# Patient Record
Sex: FEMALE | Race: ASIAN | NOT HISPANIC OR LATINO | URBAN - METROPOLITAN AREA
[De-identification: names, ages, dates, MRNs, and addresses within clinical notes are randomized per-mention and may not be internally consistent; named-entity substitution may affect disease eponyms.]

---

## 2017-06-19 ENCOUNTER — PRENATAL OFFICE VISIT - HEALTHEAST (OUTPATIENT)
Dept: MIDWIFE SERVICES | Facility: CLINIC | Age: 32
End: 2017-06-19

## 2017-06-19 DIAGNOSIS — Z34.81 ENCOUNTER FOR SUPERVISION OF OTHER NORMAL PREGNANCY, FIRST TRIMESTER: ICD-10-CM

## 2017-06-19 DIAGNOSIS — Z32.00 POSSIBLE PREGNANCY: ICD-10-CM

## 2017-06-19 DIAGNOSIS — Z83.49 FAMILY HISTORY OF THYROID DISEASE: ICD-10-CM

## 2017-06-19 DIAGNOSIS — O09.291 H/O MATERNAL THIRD DEGREE PERINEAL LACERATION, CURRENTLY PREGNANT, FIRST TRIMESTER: ICD-10-CM

## 2017-06-19 DIAGNOSIS — N92.6 IRREGULAR MENSES: ICD-10-CM

## 2017-06-19 LAB — HIV 1+2 AB+HIV1 P24 AG SERPL QL IA: NEGATIVE

## 2017-06-19 ASSESSMENT — MIFFLIN-ST. JEOR: SCORE: 1131.9

## 2017-06-20 ENCOUNTER — HOSPITAL ENCOUNTER (OUTPATIENT)
Dept: ULTRASOUND IMAGING | Facility: HOSPITAL | Age: 32
Discharge: HOME | End: 2017-06-20
Attending: ADVANCED PRACTICE MIDWIFE

## 2017-06-20 DIAGNOSIS — N92.6 IRREGULAR MENSES: ICD-10-CM

## 2017-06-20 LAB
HBV SURFACE AG SERPL QL IA: NEGATIVE
HCV AB SERPL QL IA: NEGATIVE
SYPHILIS RPR SCREEN - HISTORICAL: NORMAL

## 2017-06-21 ENCOUNTER — AMBULATORY - HEALTHEAST (OUTPATIENT)
Dept: MIDWIFE SERVICES | Facility: CLINIC | Age: 32
End: 2017-06-21

## 2017-07-12 ENCOUNTER — COMMUNICATION - HEALTHEAST (OUTPATIENT)
Dept: MIDWIFE SERVICES | Facility: CLINIC | Age: 32
End: 2017-07-12

## 2017-08-04 ENCOUNTER — PRENATAL OFFICE VISIT - HEALTHEAST (OUTPATIENT)
Dept: MIDWIFE SERVICES | Facility: CLINIC | Age: 32
End: 2017-08-04

## 2017-08-04 DIAGNOSIS — Z34.82 ENCOUNTER FOR SUPERVISION OF OTHER NORMAL PREGNANCY, SECOND TRIMESTER: ICD-10-CM

## 2017-08-04 RX ORDER — BENZOYL PEROXIDE 5 G/100G
GEL TOPICAL DAILY
Status: SHIPPED | COMMUNITY
Start: 2017-08-04

## 2017-08-04 ASSESSMENT — MIFFLIN-ST. JEOR: SCORE: 1133.71

## 2017-08-28 ENCOUNTER — COMMUNICATION - HEALTHEAST (OUTPATIENT)
Dept: MIDWIFE SERVICES | Facility: CLINIC | Age: 32
End: 2017-08-28

## 2017-08-28 ENCOUNTER — COMMUNICATION - HEALTHEAST (OUTPATIENT)
Dept: OBGYN | Facility: CLINIC | Age: 32
End: 2017-08-28

## 2017-08-29 ENCOUNTER — HOSPITAL ENCOUNTER (OUTPATIENT)
Dept: ULTRASOUND IMAGING | Facility: CLINIC | Age: 32
Discharge: HOME | End: 2017-08-29
Attending: MIDWIFE

## 2017-08-29 ENCOUNTER — OFFICE VISIT - HEALTHEAST (OUTPATIENT)
Dept: FAMILY MEDICINE | Facility: CLINIC | Age: 32
End: 2017-08-29

## 2017-08-29 ENCOUNTER — AMBULATORY - HEALTHEAST (OUTPATIENT)
Dept: MIDWIFE SERVICES | Facility: CLINIC | Age: 32
End: 2017-08-29

## 2017-08-29 DIAGNOSIS — L50.9 URTICARIA: ICD-10-CM

## 2017-08-29 DIAGNOSIS — Z34.82 ENCOUNTER FOR SUPERVISION OF OTHER NORMAL PREGNANCY, SECOND TRIMESTER: ICD-10-CM

## 2017-08-29 DIAGNOSIS — R00.0 TACHYCARDIA: ICD-10-CM

## 2017-08-30 ENCOUNTER — COMMUNICATION - HEALTHEAST (OUTPATIENT)
Dept: SCHEDULING | Facility: CLINIC | Age: 32
End: 2017-08-30

## 2017-08-30 ENCOUNTER — COMMUNICATION - HEALTHEAST (OUTPATIENT)
Dept: ADMINISTRATIVE | Facility: CLINIC | Age: 32
End: 2017-08-30

## 2017-08-30 ENCOUNTER — COMMUNICATION - HEALTHEAST (OUTPATIENT)
Dept: MIDWIFE SERVICES | Facility: CLINIC | Age: 32
End: 2017-08-30

## 2017-08-31 ENCOUNTER — AMBULATORY - HEALTHEAST (OUTPATIENT)
Dept: MIDWIFE SERVICES | Facility: CLINIC | Age: 32
End: 2017-08-31

## 2017-08-31 ENCOUNTER — OFFICE VISIT - HEALTHEAST (OUTPATIENT)
Dept: INTERNAL MEDICINE | Facility: CLINIC | Age: 32
End: 2017-08-31

## 2017-08-31 DIAGNOSIS — Z09 HOSPITAL DISCHARGE FOLLOW-UP: ICD-10-CM

## 2017-08-31 DIAGNOSIS — L50.9 URTICARIA: ICD-10-CM

## 2017-08-31 DIAGNOSIS — L29.9 PRURITUS: ICD-10-CM

## 2017-08-31 DIAGNOSIS — R22.0 FACIAL SWELLING: ICD-10-CM

## 2017-09-06 ENCOUNTER — COMMUNICATION - HEALTHEAST (OUTPATIENT)
Dept: MIDWIFE SERVICES | Facility: CLINIC | Age: 32
End: 2017-09-06

## 2017-09-07 ENCOUNTER — PRENATAL OFFICE VISIT - HEALTHEAST (OUTPATIENT)
Dept: MIDWIFE SERVICES | Facility: CLINIC | Age: 32
End: 2017-09-07

## 2017-09-07 DIAGNOSIS — Z34.82 ENCOUNTER FOR SUPERVISION OF OTHER NORMAL PREGNANCY, SECOND TRIMESTER: ICD-10-CM

## 2017-09-07 DIAGNOSIS — R05.9 COUGH: ICD-10-CM

## 2017-09-07 ASSESSMENT — MIFFLIN-ST. JEOR: SCORE: 1147.32

## 2017-10-19 ENCOUNTER — PRENATAL OFFICE VISIT - HEALTHEAST (OUTPATIENT)
Dept: MIDWIFE SERVICES | Facility: CLINIC | Age: 32
End: 2017-10-19

## 2017-10-19 DIAGNOSIS — Z34.83 ENCOUNTER FOR SUPERVISION OF OTHER NORMAL PREGNANCY IN THIRD TRIMESTER: ICD-10-CM

## 2017-10-19 ASSESSMENT — MIFFLIN-ST. JEOR: SCORE: 1191.77

## 2017-10-20 LAB
HCV AB SERPL QL IA: NEGATIVE
SYPHILIS RPR SCREEN - HISTORICAL: NORMAL

## 2017-11-17 ENCOUNTER — PRENATAL OFFICE VISIT - HEALTHEAST (OUTPATIENT)
Dept: MIDWIFE SERVICES | Facility: CLINIC | Age: 32
End: 2017-11-17

## 2017-11-17 DIAGNOSIS — Z34.83 ENCOUNTER FOR SUPERVISION OF OTHER NORMAL PREGNANCY IN THIRD TRIMESTER: ICD-10-CM

## 2017-11-17 DIAGNOSIS — M25.552 HIP PAIN, ACUTE, LEFT: ICD-10-CM

## 2017-11-17 ASSESSMENT — MIFFLIN-ST. JEOR: SCORE: 1220.8

## 2017-12-07 ENCOUNTER — PRENATAL OFFICE VISIT - HEALTHEAST (OUTPATIENT)
Dept: MIDWIFE SERVICES | Facility: CLINIC | Age: 32
End: 2017-12-07

## 2017-12-07 DIAGNOSIS — O09.291 H/O MATERNAL THIRD DEGREE PERINEAL LACERATION, CURRENTLY PREGNANT, FIRST TRIMESTER: ICD-10-CM

## 2017-12-07 DIAGNOSIS — Z34.83 ENCOUNTER FOR SUPERVISION OF OTHER NORMAL PREGNANCY IN THIRD TRIMESTER: ICD-10-CM

## 2017-12-07 ASSESSMENT — MIFFLIN-ST. JEOR: SCORE: 1235.77

## 2017-12-21 ENCOUNTER — PRENATAL OFFICE VISIT - HEALTHEAST (OUTPATIENT)
Dept: MIDWIFE SERVICES | Facility: CLINIC | Age: 32
End: 2017-12-21

## 2017-12-21 DIAGNOSIS — Z34.83 ENCOUNTER FOR SUPERVISION OF OTHER NORMAL PREGNANCY IN THIRD TRIMESTER: ICD-10-CM

## 2017-12-21 DIAGNOSIS — B95.1 POSITIVE GBS TEST: ICD-10-CM

## 2017-12-21 ASSESSMENT — MIFFLIN-ST. JEOR: SCORE: 1243.03

## 2017-12-29 ENCOUNTER — PRENATAL OFFICE VISIT - HEALTHEAST (OUTPATIENT)
Dept: MIDWIFE SERVICES | Facility: CLINIC | Age: 32
End: 2017-12-29

## 2017-12-29 DIAGNOSIS — B95.1 POSITIVE GBS TEST: ICD-10-CM

## 2017-12-29 DIAGNOSIS — Z34.83 ENCOUNTER FOR SUPERVISION OF OTHER NORMAL PREGNANCY IN THIRD TRIMESTER: ICD-10-CM

## 2017-12-29 ASSESSMENT — MIFFLIN-ST. JEOR: SCORE: 1250.74

## 2017-12-30 ENCOUNTER — COMMUNICATION - HEALTHEAST (OUTPATIENT)
Dept: MIDWIFE SERVICES | Facility: CLINIC | Age: 32
End: 2017-12-30

## 2017-12-30 DIAGNOSIS — Z34.83 ENCOUNTER FOR SUPERVISION OF OTHER NORMAL PREGNANCY IN THIRD TRIMESTER: ICD-10-CM

## 2017-12-30 DIAGNOSIS — M25.552 HIP PAIN, ACUTE, LEFT: ICD-10-CM

## 2018-01-05 ENCOUNTER — PRENATAL OFFICE VISIT - HEALTHEAST (OUTPATIENT)
Dept: MIDWIFE SERVICES | Facility: CLINIC | Age: 33
End: 2018-01-05

## 2018-01-05 DIAGNOSIS — Z34.83 ENCOUNTER FOR SUPERVISION OF OTHER NORMAL PREGNANCY IN THIRD TRIMESTER: ICD-10-CM

## 2018-01-05 ASSESSMENT — MIFFLIN-ST. JEOR: SCORE: 1251.65

## 2018-01-08 ENCOUNTER — PRENATAL OFFICE VISIT - HEALTHEAST (OUTPATIENT)
Dept: MIDWIFE SERVICES | Facility: CLINIC | Age: 33
End: 2018-01-08

## 2018-01-08 DIAGNOSIS — Z34.83 ENCOUNTER FOR SUPERVISION OF OTHER NORMAL PREGNANCY IN THIRD TRIMESTER: ICD-10-CM

## 2018-01-08 DIAGNOSIS — O48.0 POST-TERM PREGNANCY, 40-42 WEEKS OF GESTATION: ICD-10-CM

## 2018-01-08 ASSESSMENT — MIFFLIN-ST. JEOR: SCORE: 1257.54

## 2018-01-10 ENCOUNTER — ANESTHESIA - HEALTHEAST (OUTPATIENT)
Dept: OBGYN | Facility: HOSPITAL | Age: 33
End: 2018-01-10

## 2018-01-10 ENCOUNTER — COMMUNICATION - HEALTHEAST (OUTPATIENT)
Dept: OBGYN | Facility: CLINIC | Age: 33
End: 2018-01-10

## 2018-01-10 DIAGNOSIS — M25.552 HIP PAIN, ACUTE, LEFT: ICD-10-CM

## 2018-01-10 DIAGNOSIS — Z34.83 ENCOUNTER FOR SUPERVISION OF OTHER NORMAL PREGNANCY IN THIRD TRIMESTER: ICD-10-CM

## 2018-01-10 DIAGNOSIS — O48.0 POST-TERM PREGNANCY, 40-42 WEEKS OF GESTATION: ICD-10-CM

## 2018-01-11 ENCOUNTER — HOME CARE/HOSPICE - HEALTHEAST (OUTPATIENT)
Dept: HOME HEALTH SERVICES | Facility: HOME HEALTH | Age: 33
End: 2018-01-11

## 2018-01-12 ENCOUNTER — HOME CARE/HOSPICE - HEALTHEAST (OUTPATIENT)
Dept: HOME HEALTH SERVICES | Facility: HOME HEALTH | Age: 33
End: 2018-01-12

## 2018-01-16 ENCOUNTER — COMMUNICATION - HEALTHEAST (OUTPATIENT)
Dept: MIDWIFE SERVICES | Facility: CLINIC | Age: 33
End: 2018-01-16

## 2018-01-23 ENCOUNTER — COMMUNICATION - HEALTHEAST (OUTPATIENT)
Dept: TELEHEALTH | Facility: CLINIC | Age: 33
End: 2018-01-23

## 2018-01-23 ENCOUNTER — COMMUNICATION - HEALTHEAST (OUTPATIENT)
Dept: HEALTH INFORMATION MANAGEMENT | Facility: CLINIC | Age: 33
End: 2018-01-23

## 2018-02-19 ENCOUNTER — OFFICE VISIT - HEALTHEAST (OUTPATIENT)
Dept: MIDWIFE SERVICES | Facility: CLINIC | Age: 33
End: 2018-02-19

## 2018-02-19 DIAGNOSIS — Z30.9 CONTRACEPTIVE MANAGEMENT: ICD-10-CM

## 2018-02-19 ASSESSMENT — MIFFLIN-ST. JEOR: SCORE: 1193.93

## 2018-02-26 ENCOUNTER — OFFICE VISIT - HEALTHEAST (OUTPATIENT)
Dept: MIDWIFE SERVICES | Facility: CLINIC | Age: 33
End: 2018-02-26

## 2018-02-26 ENCOUNTER — AMBULATORY - HEALTHEAST (OUTPATIENT)
Dept: MIDWIFE SERVICES | Facility: CLINIC | Age: 33
End: 2018-02-26

## 2018-02-26 ENCOUNTER — AMBULATORY - HEALTHEAST (OUTPATIENT)
Dept: LAB | Facility: CLINIC | Age: 33
End: 2018-02-26

## 2018-02-26 DIAGNOSIS — Z97.5 NEXPLANON IN PLACE: ICD-10-CM

## 2018-02-26 DIAGNOSIS — Z30.017 NEXPLANON INSERTION: ICD-10-CM

## 2018-02-26 LAB
HCG UR QL: NEGATIVE
SP GR UR STRIP: 1.02 (ref 1–1.03)

## 2018-02-26 ASSESSMENT — MIFFLIN-ST. JEOR: SCORE: 1183.5

## 2021-05-31 VITALS — BODY MASS INDEX: 23.15 KG/M2 | WEIGHT: 117.9 LBS | HEIGHT: 60 IN

## 2021-05-31 VITALS — WEIGHT: 114.9 LBS | HEIGHT: 60 IN | BODY MASS INDEX: 22.56 KG/M2

## 2021-05-31 VITALS — WEIGHT: 134.1 LBS | HEIGHT: 60 IN | BODY MASS INDEX: 26.33 KG/M2

## 2021-05-31 VITALS — BODY MASS INDEX: 24.03 KG/M2 | WEIGHT: 122 LBS

## 2021-05-31 VITALS — HEIGHT: 60 IN | WEIGHT: 137.4 LBS | BODY MASS INDEX: 26.97 KG/M2

## 2021-05-31 VITALS — BODY MASS INDEX: 27.62 KG/M2 | HEIGHT: 60 IN | WEIGHT: 140.7 LBS

## 2021-05-31 VITALS — WEIGHT: 114.5 LBS | HEIGHT: 60 IN | BODY MASS INDEX: 22.48 KG/M2

## 2021-05-31 VITALS — BODY MASS INDEX: 27.92 KG/M2 | WEIGHT: 142.2 LBS | HEIGHT: 60 IN

## 2021-05-31 VITALS — BODY MASS INDEX: 22.65 KG/M2 | WEIGHT: 115 LBS

## 2021-05-31 VITALS — WEIGHT: 140.9 LBS | HEIGHT: 60 IN | BODY MASS INDEX: 27.66 KG/M2

## 2021-05-31 VITALS — BODY MASS INDEX: 25.07 KG/M2 | HEIGHT: 60 IN | WEIGHT: 127.7 LBS

## 2021-05-31 VITALS — BODY MASS INDEX: 27.29 KG/M2 | WEIGHT: 139 LBS | HEIGHT: 60 IN

## 2021-06-01 VITALS — BODY MASS INDEX: 24.54 KG/M2 | WEIGHT: 125 LBS | HEIGHT: 60 IN

## 2021-06-01 VITALS — BODY MASS INDEX: 24.99 KG/M2 | HEIGHT: 60 IN | WEIGHT: 127.3 LBS

## 2021-06-11 NOTE — PROGRESS NOTES
PRENATAL VISIT   FIRST OBSTETRICAL EXAM - OB    Assessment / Impression     First prenatal visit at 10w6d    Family hx of thyroid disorder  Breast implants   Hep B carrier    Plan:     -IOB labs drawn.  -Pt is not interested in drawing lead level.  -Patient is interested in waterbirth.  HIV and Hep C drawn today.  -Reviewed prenatal care schedule.  -Optimal nutrition and weight gain discussed. Pregnancy weight gain of 25-35 lbs (BMI 18.5-24.9) encouraged.   -Anticipatory guidance for common pregnancy questions and concerns reviewed.   -Danger s/sx for this trimester reviewed with patient.  -Reviewed genetic carrier screening specific to patients ethnic heritage.  Patient declines the screening  -Reviewed genetic aneuploidy screening options. Patient will consider first trimester screening.  She would like to do the dating ultrasound first and decide from there.  I wrote down all the information of her options between first trimester screening and verify screening.  -The following referrals were given to patient for follow up: First trimester ultrasound for dating due to last period Being irregular.  -IOB packet given and reviewed with patient.  -CNM services and hospital options reviewed; emergency and scheduling phone numbers given to patient.  -Return to clinic 4-6 weeks    Total time spent with patient: 45 minutes, >50% time spent counseling and coordinating care.    Subjective:    Whitney Colón is a 32 y.o.  here today for her First Obstetrical Exam.  She is excited to be pregnant as her and her  were actively trying.  She believes that she only wants to children decided to get pregnant now so she could be done being pregnant as soon as she is able.  She breast-fed her son, German, for 4 months.  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    Her last menstrual period was somewhat irregular.  She called 2017 her last  "menstrual period but 2 weeks after that she had a menstrual period that was \"less than normal\".  It could have been bleeding in pregnancy or it could have been irregular menses.  Either way, I think a dating ultrasound is appropriate due to this unusual cycle.    She also notes that her  is a hepatitis B carrier, but she is not worried about it as she knows that she is immune to hepatitis B.  I discussed all the normal labs that we do at an IOB which includes checking Hep B.    Education level: College Graduate  Occupation: Ultrasound tech  Partners name: Cullen  Partners occupation:     OB History    Para Term  AB Living   2 1 1 0 0 1   SAB TAB Ectopic Multiple Live Births   0 0 0 0 1      # Outcome Date GA Lbr Kameron/2nd Weight Sex Delivery Anes PTL Lv   2 Current            1 Term 16 39w4d 03:00 / 01:46 7 lb 1.2 oz (3.21 kg) M Vag-Spont Inhalation,Local N [place]      Birth Comments: GBS+ with adequate txtmt, partial 3rd degree tear w/ bilat sulcus tears and L labial tear, 400ml EBL, BF x4 mths          Expected Date of Delivery: 2018, by Last Menstrual Period    Past Medical History:   Diagnosis Date     Acne     uses 1% clinda/5% benzyl peroxide gel     Heart murmur     discovered with first pregnancy     Past Surgical History:   Procedure Laterality Date     WISDOM TOOTH EXTRACTION      no comp with GA     Social History   Substance Use Topics     Smoking status: Never Smoker     Smokeless tobacco: Never Used     Alcohol use No     Current Outpatient Prescriptions   Medication Sig Dispense Refill     cholecalciferol, vitamin D3, (VITAMIN D3) 2,000 unit cap Take 1 capsule by mouth.       PRENATAL VIT #91/FE FUM/FA/DHA (PRENATAL + DHA ORAL) Take 1 capsule by mouth.       No current facility-administered medications for this visit.      Allergies   Allergen Reactions     Other Environmental Allergy      Seasonal- fall              Pregnancy Risk Factors: None    EPDS score " "today: 8    Review of Systems  General:  Denies problem  Eyes: Denies problem  Ears/Nose/Throat: Denies problem  Cardiovascular: Denies problem  Respiratory:  Denies problem  Gastrointestinal:  Mild Nausea, vomiting 2x/week with pregnancy.  Genitourinary: Denies problem  Musculoskeletal:  Denies problem  Skin: Denies problem  Neurologic: Denies problem  Psychiatric: Denies problem  Endocrine: Denies problem  Heme/Lymphatic: Denies problem   Allergic/Immunologic: Denies problem       Objective:   Objective    Vitals:    06/19/17 1005   BP: 96/52   Pulse: 84   Resp: 18   Weight: 114 lb 8 oz (51.9 kg)   Height: 4' 11.75\" (1.518 m)     Physical Exam:  General Appearance: Alert, cooperative, no distress, appears stated age  Head: Normocephalic, without obvious abnormality, atraumatic  Eyes: Conjunctiva/corneas clear, does not wear corrective lenses  Neck: Supple, symmetrical, trachea midline, no adenopathy  Thyroid: not enlarged, symmetric, no tenderness/mass/nodules  Back: Symmetric, no curvature, ROM normal, no CVA tenderness  Lungs: Clear to auscultation bilaterally, respirations unlabored  Heart: Regular rate and rhythm, S1 and S2 normal, faint known murmur heard, rub, or gallop  Breasts: No breast masses, tenderness, asymmetry, or nipple discharge. Nipples are everted.  Has breast implants - scar in bilateral axilla.  Abdomen: Soft, non-tender, bowel sounds active all four quadrants, no masses.   FHT: 170s   Vulva:  no sign of lesions or condyloma, normal hair distribution  Vagina: pink, normal rugae, no abnormal discharge  Cervix:  long/thick/closed, no lesions or inflammation noted, negative CMT with exam  Uterus: Mid position, non tender, enlarged approximately 12 weeks size  Adnexa:  no masses appreciated, non-tender with palpation  Pelvic spines:AVERAGE  Sacrum:STRAIGHT  Suprapubic Arch:NORMAL  Pelvis type:gynecoid            Extremities: Extremities normal, atraumatic, no cyanosis or edema  Skin: Skin color, " texture, turgor normal, no rashes or lesions  Lymph nodes: Cervical, supraclavicular, and axillary nodes normal  Neurologic: Alert and oriented x 3.    Lab:   Results for orders placed or performed during the hospital encounter of 07/11/16   Rupture of Membrane Test or Screen   Result Value Ref Range    Rupture Fetal Membrane Positive (!) Negative   Culture, Urine   Result Value Ref Range    Culture Mixture of urogenital organisms with     Culture 10,000-50,000 col/ml Streptococcus group B (!)    Urinalysis-UC if Indicated   Result Value Ref Range    Color, UA Yellow Colorless, Yellow, Straw, Light Yellow    Clarity, UA Turbid (!) Clear    Glucose, UA Negative Negative    Bilirubin, UA Negative Negative    Ketones, UA Negative Negative, 60 mg/dL    Specific Gravity, UA 1.010 1.001 - 1.030    Blood, UA Moderate (!) Negative    pH, UA 6.5 4.5 - 8.0    Protein, UA 70 mg/dL (!) Negative mg/dL    Urobilinogen, UA <2.0 E.U./dL <2.0 E.U./dL, 2.0 E.U./dL    Nitrite, UA Negative Negative    Leukocytes, UA Moderate (!) Negative    Bacteria, UA None Seen None Seen hpf    RBC, UA >100 (!) None Seen, 0-2 hpf    WBC, UA 10-25 (!) None Seen, 0-5 hpf    Squam Epithel, UA >100 (!) None Seen, 0-5 lpf   Syphilis Screen, Cascade   Result Value Ref Range    Syphilis Screen Cascade Non-Reactive Non-Reactive   OB External Results   Result Value Ref Range    HBsAg External Result Negative    RPR   Result Value Ref Range    Syphilis Screen Cascade Non-Reactive Non-Reactive

## 2021-06-12 NOTE — PROGRESS NOTES
Whitney is here alone for a Hitesh visit. She was seen in the [facility] ED on 8/29/17 for urticaria and facial swelling. She had been in a hot tub in the [place]. She was given prednisone 40mg po and symptoms resolved. Now she has had a tickle in her throat/cough x 7 days. Occasionally coughs up yellowish mucus.  Throat not particularly sore. Temp 97.9 today. Lungs are clear. Discussed robitussin or delsym cough syrup, gargling with salt water, cough lozenges. Given written info on colds in pregnancy. Information given on 1 hr GCT/RPR/Hgb. She prefers RTC in 6 weeks for those labs. Reviewed her 20 week fetal survey US. Anticipates boy. She works [employer] at [employer] [place] and her son is 14 months old. Recommended PCP if febrile or cough symptoms worsen. Denies PTL. Discussed TDap for next visit.

## 2021-06-12 NOTE — PROGRESS NOTES
Assessment:     1. Urticaria     2. Tachycardia       Given urticarial rash with oral complaints and tachycardia at 21 wks pregnant, will transfer to ER for complete evaluation and treatment.      Plan:     Discussed with patient that she requires further evaluation than can be provided in the [facility] at this time.  Patient transferred to the ED via . ED notified of her impending arrival. Patient agreeable with plan      Subjective:       Whitney Colón is a 32 y.o. female who presents with  for evaluation of hives and facial and oral edema.  This started 3 days ago, and have been gradually worsening since that time.   Associated symptoms include red, itchy rash, joint pain, facial swelling yesterday morning, tongue swelling started last evening. She is able to swallow but has pain with swallowing. Has also had palpitations for 2 days.  Denies any SOB, chest pain. She is 21 wks pregnant. She has been taking benadryl 25mg every 4 - 6 hours without significant improvement in rash. She is unsure of the potential trigger - she had been in [place] and had been in a hot tub. She had also used a new lotion. No other new foods, soaps, detergents, clothing, environmental exposures known to trigger the reaction.    Review of Systems  Pertinent items are noted in HPI.      Objective:      /60  Pulse (!) 125  Temp 99.4  F (37.4  C) (Oral)   Resp 16  Wt 115 lb (52.2 kg)  LMP 04/04/2017 (Exact Date)  SpO2 99%  Breastfeeding? No  BMI 22.65 kg/m2  General appearance: alert, appears stated age and cooperative  Throat: lips, mucosa, and tongue normal; teeth and gums normal and no visible angioedema.   Lungs: clear to auscultation bilaterally  Heart: tachy, normal rhythm, no clicks, murmurs, gallops or rubs.   Skin: erythematous maculopapular urticarial rash on trunk and extremeties. Excoriation present. No vesicles, scale or crust.

## 2021-06-12 NOTE — PROGRESS NOTES
Whitney is here alone for BHANU visit. She is surprised with no weight gain past month. She had mild nausea until 15 - 16 weeks. Emesis only x 2. We discussed nutrition and encouraged 3 meals and 2-3 snacks daily. States she gained about 40# in first pregnancy. She accepted the QS which was drawn today. Referral for fetal survey was sent. She is an [employer] and believes she is having a second boy. Reviewed IOB labs. Started CNM details which were placed in chart.

## 2021-06-13 NOTE — PROGRESS NOTES
Whitney is here alone today for BHANU visit and 1 hr GCT, Hgb, and RPR. URI symptoms resolved. Discussed WB and she has some interest so will get hepatitis C today. Written WB information given. PTL reviewed. Discussed postpartum contraception. She is most interested in IUD or Nexplanon. Brochures given each method. MC message sent regarding 1 hr BS = 102 and hemoglobin = 12.3

## 2021-06-14 NOTE — PROGRESS NOTES
Whitney is here for her BHANU visit at 35w 2d. She reports feeling well. She is still coping with her left sided hip pain. She is reporting feeling an increase in pressure, and some mild/moderate gracie dugan contractions. She would like an exam today to see if she is dilating. (SVE: closed, thick, high). She denies LOF, and vaginal bleeding. S/S of PTL and PreE reviewed and when to call. Also encouraged to notify CNM if changes or decrease in fetal movements.Hx of 3rd degree tear, Reviewed perineal massage and ways to lower risk.  GBS swab collected, hemoglobin today.RTC 1 week.    Ritu Dotson RN, SNM  I was present and agree with SNM assessment, Leopolds confirmed vertex.  ADAM Palacio,CNM

## 2021-06-14 NOTE — PROGRESS NOTES
Whitney Colón is a 32 y.o. female  at 32w3d weeks doing well. Here alone. Denies any warning s/sx. GFM. Notes questions about episiotomy. She states that she had a 3rd degree perineal laceration and is wondering if having an episiotomy would prevent that. Discussed that sometimes episiotomies carry a risk of extending further and it's not routine to cut preventative episiotomies. She states she had thought that by cutting an episiotomy, it would have prevented a laceration but now understands that isn't necessarily the case. She also notes some left hip pain from always sleeping on her left side. We discussed she could also sleep on her R side and on her back as long as she was elevated approximately 30 degrees up off her back. We also discussed chiropractic care that she could access to help adjust her pelvis and possibly bring relief. She states she will look into this. Plan is to RTC 2 weeks. She also wonders if we saw that she had an echocardiogram in her first pregnancy for a likely benign murmur; records reviewed and no follow up was indicated and they felt it was likely benign and amplified with increased blood volume in pregnancy. Reviewed this with patient.

## 2021-06-14 NOTE — PROGRESS NOTES
Whitney is here alone for her BHANU visit @ 37 weeks. She works 2 days next week and then will be on maternity leave. States she has had quite a few contractions and wishes her cervix to be checked. EO 1 cm, IO closed, 50%, soft, vertex -3 station. Suture palpated through lower uterine segment. Continues to have discomfort with L hip. Discussed positive GBS culture - she was also positive with her first baby. She had adequate prophylaxis and was discharged after just 1 overnight which is her preference this time. Discussed calling if SROM or in early labor as positive GBS.

## 2021-06-15 NOTE — ANESTHESIA POSTPROCEDURE EVALUATION
Patient: Whitney Colón  * No procedures listed *  Anesthesia type: epidural    Patient location: Labor and Delivery  Last vitals:   Vitals:    01/10/18 1555   BP: 111/69   Pulse: 100   Resp: 16   Temp: 37.1  C (98.8  F)   SpO2:      Post vital signs: stable  Level of consciousness: awake and responds to simple questions  Post-anesthesia pain: pain controlled  Post-anesthesia nausea and vomiting: no  Pulmonary: unassisted, return to baseline  Cardiovascular: stable and blood pressure at baseline  Hydration: adequate  Anesthetic events: no    QCDR Measures:  ASA# 11 - [place]-op Cardiac Arrest: ASA11B - Patient did NOT experience unanticipated cardiac arrest  ASA# 12 - [place]-op Mortality Rate: ASA12B - Patient did NOT die  ASA# 13 - PACU Re-Intubation Rate: NA - No ETT / LMA used for case  ASA# 10 - Composite Anes Safety: ASA10A - No serious adverse event    Additional Notes:

## 2021-06-15 NOTE — ANESTHESIA PREPROCEDURE EVALUATION
Anesthesia Evaluation      Patient summary reviewed   No history of anesthetic complications     Airway   Mallampati: II  Neck ROM: full   Pulmonary - negative ROS and normal exam                          Cardiovascular - negative ROS and normal exam   Neuro/Psych - negative ROS     Endo/Other    (+) pregnant     GI/Hepatic/Renal - negative ROS      Other findings: Physiologic changes of pregnancy      Dental - normal exam                        Anesthesia Plan  Planned anesthetic: epidural  Patient requests labor epidural. Chart reviewed, including labs. Reviewed options and risks with the patient, including but not limited to: bleeding, infection, damage to tissues under the skin (nerves, muscles, blood vessels), hypotension, headache, and epidural failure. Questions answered, consent signed. Patient agrees to elective labor epidural.         ASA 2     Anesthetic plan and risks discussed with: patient and spouse    Post-op plan: routine recovery and epidural analgesia        Results for orders placed or performed in visit on 12/07/17   Group B Strep Screen by PCR   Result Value Ref Range    Group B Strep Antigen Positive (!) Negative    Allergic to Penicillin No    Hemoglobin   Result Value Ref Range    Hemoglobin 12.2 12.0 - 16.0 g/dL

## 2021-06-15 NOTE — PROGRESS NOTES
"Whitney is here alone for a return OB visit at 38 weeks and 3 days.  She states that today is her last day of work.  She requests a vaginal exam today.  She is 1/50/-2/soft/posterior and these results were discussed with the patient.  Sutures were palpated on exam today.  She expresses that she would like to be \"done\" with this pregnancy.  She states that if she gets to 40 weeks she will want to be induced.  I discussed that she should come back for her routine OB visit next week and at that point if she still pregnant, we can begin to set up elective induction scheduling for her at that time.  Patient is aware that she will need to call if she breaks her water or on the earlier side in labor to come in for GBS prophylaxis.  Plan is to return to clinic in 1 week.  "

## 2021-06-15 NOTE — PROGRESS NOTES
Whitney Colón is here by herself for a routine prenatal visit at 39w6d.  She is hoping that her cervix is ripe enough so that she can schedule an elective induction.  Fetal movement is normal. Interval weight gain is approriate. Discussed postdates management, risks of expectant management to 42 weeks if BPP/NST are normal and no medical indications for IOL arise sooner vs. IOL at 41+ weeks. Pt desires cervical ripening/IOL.  She thought her due date was today, 1/8.  I explained that her pregnancy is dated via her LMP.  I explained that the early ultrasound put her due date at 1/8, which in that case we continue with the LMP date.  Her cervix was unchanged from last Friday with a Frey score of 4.  I explained that we could not do an elective induction at this time but I did give her the option of membranes sweeping with risk/benefits explained.  She desires membrane sweep and it was performed without difficulty very scant amount of brown discharge noted and mostly mucus noted.  I did set up cervical ripening induction for Monday, 1/15 at 4 PM at [facility].  I discussed the 3 cervical ripening options and that we will make a plan on admission.  I also instructed her to call the unit at 3 PM.  I also went over in detail of when to call the on-call nurse midwife I reviewed the appropriate phone number to call for spontaneous labor.  I also gave her the option to schedule an additional appointment to see if her cervix is ready later in the week for Pitocin or AROM.  Advised she do fetal movement counts daily. Anticipatory guidance, signs of symptoms of labor or SROM, third trimester warning signs, when to call and CNM contact information reviewed.

## 2021-06-15 NOTE — ANESTHESIA PROCEDURE NOTES
Epidural Block    Patient location during procedure: OB  Time Called: 1/10/2018 5:40 AM  Reason for Block:labor epidural  Staffing:  Performing  Anesthesiologist: TEJA CHEEMA  Preanesthetic Checklist  Completed: patient identified, risks, benefits, and alternatives discussed, timeout performed, consent obtained, at patient's request, airway assessed, oxygen available, suction available, emergency drugs available and hand hygiene performed  Procedure  Patient position: sitting  Prep: ChloraPrep and site prepped and draped  Patient monitoring: continuous pulse oximetry, heart rate and blood pressure  Approach: midline  Location: L3-L4  Injection technique: JENIFER saline  Number of Attempts:1  Needle  Needle type: [employer]   Needle gauge: 18 G     Catheter in Space: 6  Assessment  Sensory level:  No complications      Additional Notes:  Patient requests labor epidural. Chart reviewed, including labs. Reviewed options and risks with the patient, including but not limited to: bleeding, infection, damage to tissues under the skin (nerves, muscles, blood vessels), hypotension, headache, and epidural failure. Questions answered, consent signed. Patient agrees to elective labor epidural.     Hands washed, sterile technique used, including scrub hat, mask, and gloves.     Test dose negative for heme/SAB.

## 2021-06-15 NOTE — PROGRESS NOTES
Whitney Colón presents to clinic today for a routine prenatal visit. Feeling okay, ready.  Reports + fetal movement daily. Denies any regular contractions or LOF, reports a small amount of bleeding with loss of mucus plug last week, nothing since. Really wants elective IOL.  Discussed cervical exam and bentley score today of 5.  Discussed parameters for elective IOL and recommended that patient make another appointment for early next week for re-evaluation of her cervix if desired, but IOL not a safe option at this point, patient states understanding.  Discussed ways to encourage labor. Reports normal appetite, eating well and hydrating adequately.  Sleeping well. Warning sxs and when to call reviewed, patient has CNM contact information.  Anticipatory guidance re: end of pregnancy self care provided today.

## 2021-06-16 PROBLEM — Z97.5 NEXPLANON IN PLACE: Status: ACTIVE | Noted: 2018-02-26

## 2021-06-16 PROBLEM — Z83.49 FAMILY HISTORY OF THYROID DISEASE: Status: ACTIVE | Noted: 2017-06-19

## 2021-06-16 PROBLEM — M25.552 HIP PAIN, ACUTE, LEFT: Status: ACTIVE | Noted: 2017-11-17

## 2021-06-16 NOTE — PROGRESS NOTES
JAIME@Patient ID: Whitney Colón is a 32 y.o. female.  Assessment:   Normal postpartum exam at ~6 weeks postpartum.   lactating    Plan:    1. Pap smear not done at today's visit. Due for annual Gyn exam in February 2019   2. Desired contraception: Nexplanon.  Discussed risks/benefits/side effects of Nexplanon and patient desires to have inserted.  Explained no unprotected IC x2 weeks.  She states they had IC one week ago using withdrawal.  Explained that she can schedule Nexplanon in one week - refrain from IC or use condoms.  Will do UPT on arrival.    3. Discussed resumption of exercise and setting a goal to return to pre-pregnancy weight in the next 6-12 months.   4.  Resumption of intercourse reviewed with possible changes in libido and vaginal lubrication while nursing.  5.  Nutrition and supplements reviewed.  Advised continuation of a prenatal or multivitamin, also Vitamin D3 5000 IU geltab daily and an omega 3 fatty acid supplement.  6. Adjustment to parenting, self care and open communication with her support system discussed. Warning signs and symptoms related to postpartum mood disorders reviewed.   7.  Gave information on rectus diastisis and exercises/websites to help with.    Total time spent with patient 40 minutes, >50% counseling, education and coordination of care.    Subjective:     Whitney Colón is a 32 y.o. female who presents for postpartum visit. She is 6 weeks postpartum following a NSVB.  I have fully reviewed the prenatal and intrapartum course. The delivery was at Term and 40.1 weeks gestation Her baby boy is named Tuan and weighed 7 lbs 8 oz at birth.     Postpartum course has been stable. Baby's course has been stable. Baby is feeding 30% Similac and the rest Expressed breastmilk.  Lochia ceased at 4.5-5 weeks postpartum.  Bowel function is normal. Bladder function is normal. She has resumed intercourse. Desired contraception: Nexplanon. [place] postpartum depression  screening score: 8. She has not resumed regular exercise. Patient returns to work in 5 weeks.    Review of Systems  General:  Denies problem  Eyes: Denies problem  Ears/Nose/Throat: Denies problem  Cardiovascular: Denies problem  Respiratory:  Denies problem  Gastrointestinal:  Denies problem,   Genitourinary: Denies problem  Musculoskeletal:  Denies problem  Skin: Denies problem  Neurologic: Denies problem  Psychiatric: Denies Problem  Endocrine: Denies problem    Objective:         Physical Exam:  General Appearance: Alert, cooperative, no distress, appears stated age  Skin: Skin color, texture, turgor normal, no rashes or lesions  Throat: Lips, mucosa, and tongue normal; teeth and gums normal  HEENT: grossly normal; otoscopic and opthalmic exam not performed.   Neck: Supple, symmetrical, trachea midline, no adenopathy;  thyroid: not enlarged, symmetric, no tenderness/mass/nodules  Lungs: Clear to auscultation bilaterally, respirations unlabored  Breasts: No breast masses, tenderness, asymmetry, or nipple discharge.  Heart: Regular rate and rhythm, S1 and S2 normal, no murmur, rub, or gallop  Abdomen: Soft, non-tender.  2 fingerbreadth rectus diastisis  Pelvic:External genitalia normal without lesions or irritation. Vagina and cervix show no lesions, inflammation, discharge or tenderness. 2nd degree perineal laceration well approximated and well healed.   No cystocele, No rectocele. Uterus fully involuted.  No adnexal mass or tenderness.  Extremities: Extremities normal without varicosities or edema       Last Pap: 2/2016. Results were: normal  Immunization History   Administered Date(s) Administered     HPV Quadrivalent 02/02/2012     Hep A, historic 06/13/2014     Influenza, inj, historic,unspecified 09/01/2017     Influenza,seasonal quad, PF, 36+MOS 09/17/2015     Tdap 09/17/2015, 04/22/2016     Typhoid Live, Oral 06/13/2014     Immunization status: up to date and documented

## 2021-06-16 NOTE — PROGRESS NOTES
Subjective:  Pt desires Nexplanon insertion for contraception. Uses condoms with intercourse every time. Will continue to use for STI prevention    Nexplanon Insertion Procedure Note    Pre-operative Diagnosis: desires Nexplanon for contraception    PMH:  No known contraindications to Nexplanon  No current or past history of thrombosis or thromboembolic disorders   No hepatic tumors or active liver disease   No undiagnosed abnormal genital bleeding   No known or suspected carcinoma of the breast or personal history of breast cancer   No hypersensitivity to any of the components of NEXPLANON  No known history of keloids    Post-operative Diagnosis: same    Indications: contraception    Procedure Details   Urine pregnancy test was done and result was negative.  The risks (including infection, bleeding, pain) and benefits of the procedure were explained to the patient and Written informed consent was obtained.      Pt was positioned on exam table with left, non dominant arm next to her on the table. Insertion site was cleaned with alcohol prep at 8cm from elbow crease. 1% Lidocaine inserted intradermally. Site then cleansed with Betadine swab x 3.  Nexplanon was inserted at 30 degree angle, then applicator leveled horizontally and skin tented, needle inserted full length and slider retracted. Scant bleeding noted at insertion site. Site was covered with band aid and a pressure dressing. Pt instructed not to lift heavy items with left arm for 24 hours, Ibuprofen prn for pain or ice to site as need for bruising.  Implant easily palpated by CNM and patient.     Nexplanon Information:  Lot # [id number]  Removal date: 2/26/2021.    Condition:  Stable    Complications:  None    Plan:  Discussed risks and benefits of nexplanon insertion, discussed MOA, efficacy, and duration of use. Reviewed common side effects including local site reaction to insertion, transient increase in ovarian cysts, irregular bleeding and reviewed  possible side effects of progesterones.   Discussed low risk but possibility of placement in a vein, reviewed how we decrease this chance, but signs reviewed.   Discussed it may improve heavy painful periods, but common complaint of Nexplanon is the irregularity of bleeding that may or may not improve with time.   The patient was advised to call for any fever or for prolonged or severe pain or bleeding.   She was advised to use OTC ibuprofen as needed for mild to moderate pain.   Advised to use a back up method x 7 days   All questions answered.       ADAM Eric, MIRNA  2/26/2018  2:32 PM      [facility]

## 2021-06-25 NOTE — PROGRESS NOTES
Progress Notes by Vladimir Suh at 8/31/2017  8:40 AM     Author: Vladimir Suh Service: -- Author Type: Nurse Practitioner    Filed: 9/12/2017 12:06 AM Encounter Date: 8/31/2017 Status: Signed    : Vladimir Suh [facility]    Date of Service: 8/31/2017  Primary Provider: Marilee Dowling MD    Patient Care Team:  Marilee Dowling MD as PCP - General     ______________________________________________________________________     Patient's Pharmacy:    [facility] [id number] [facility] - [place], [place] - [address] [place] [address]  Phone: [phone] Fax: [phone]    [facility]/pharmacy [address] [place] [address]  Phone: [phone] Fax: [phone]     Patient's Insurance:    Payor: [employer] EMPLOYEE / Plan: [employer] / Product Type: PPO/POS/FFS /     ______________________________________________________________________    Assessment:    1. Hospital discharge follow-up    2. Urticaria    3. Pruritus    4. Facial swelling       ______________________________________________________________________      PHQ-2 Total Score: 0 (8/4/2017 10:00 AM)     Plan:  Patient Instructions   1. Prednisone 40 mg by mouth daily as needed for EMERGENCY only swelling of tongue/face  2. Benadryl as needed for itching, hives  3. Follow up with Ob/Gyn as previously directed.      ______________________________________________________________________     Whitney CAMACHO Katarzyna is 32 y.o. female who comes in today for:    Chief Complaint   Patient presents with   ? Hospital Visit Follow Up     allergic reaction, itching is gone but has face swelling, getting better       Patient Active Problem List   Diagnosis   ? Acne Vulgaris   ? Urticaria   ? Common Warts   ? Cardiac murmur, previously undiagnosed   ? Encounter for supervision of other normal  pregnancy   ? Family history of thyroid disease   ? H/O maternal third degree perineal laceration, currently pregnant, first trimester     Past Medical History:   Diagnosis Date   ? Acne     uses 1% clinda/5% benzyl peroxide gel   ? Heart murmur     discovered with first pregnancy     Past Surgical History:   Procedure Laterality Date   ? WISDOM TOOTH EXTRACTION  2007    no comp with [place]     Allergies   Allergen Reactions   ? Other Environmental Allergy      Seasonal- fall      Current Outpatient Prescriptions   Medication Sig   ? benzoyl peroxide 5 % gel Apply topically daily.   ? cholecalciferol, vitamin D3, (VITAMIN D3) 2,000 unit cap Take 1 capsule by mouth.   ? PRENATAL VIT #91/FE FUM/FA/DHA (PRENATAL + DHA ORAL) Take 1 capsule by mouth.   ? predniSONE (DELTASONE) 20 MG tablet Take 2 tablets (40 mg total) by mouth daily.     Social History     Social History   ? Marital status:      Spouse name: N/A   ? Number of children: 1   ? Years of education: N/A     Occupational History   ? [employer]     Ultrasound Technologist     Social History Main Topics   ? Smoking status: Never Smoker   ? Smokeless tobacco: Never Used   ? Alcohol use No   ? Drug use: No   ? Sexual activity: Yes     Partners: Male     Birth control/ protection: None     Other Topics Concern   ? Not on file     Social History Narrative     ______________________________________________________________________     History of present illness: Whitney Colón is a pleasant 32 y.o. female, currently 21 weeks pregnant with 2nd child, who presents in clinic today for [facility] ER follow up for acute allergic reaction on 8/29/2017.  She reports that she had been at [place] over the weekend and had been in a hot tub on Saturday and developed itching that evening.  She developed swelling in face, tongue swelling with difficulty swallowing, diffuse joint pain in knees, hands, feet, and hives all over her body  and face on Monday morning.  She had started taking Benadryl every 4-6 hours on Sunday which seemed to help with the itching, but everything else seemed to progressively worsen.  She states that on Monday morning at home upon waking she could not even open her eyes due to swelling of her face and eyelids.  She went into [facility] and was evaluated but directed to go to ER for prednisone and monitoring.  Went ER on Tuesday and had a workup which was unable to identify any cause for her symptoms.  She denies any changes in diet or eating any new or strange foods. She last took a Benadryl at 3:30pm yesterday.  Today, her symptoms appear to be mostly resolved or resolving at the time of this visit.    Review of systems:   On ROS, the patient denies hives or rash, tongue swelling, dysphagia, arthralgias, chest pain, shortness of breath, abdominal pain, or pruritus.   Positive for mild-moderate generalized facial swelling, chest heaviness, and symptoms as noted in the HPI.    ______________________________________________________________________    Wt Readings from Last 3 Encounters:   08/31/17 122 lb (55.3 kg)   08/29/17 116 lb (52.6 kg)   08/29/17 115 lb (52.2 kg)     BP Readings from Last 3 Encounters:   08/31/17 106/62   08/29/17 93/46   08/29/17 142/60     /62  Pulse 79  Temp 97.7  F (36.5  C) (Oral)   Wt 122 lb (55.3 kg)  LMP 04/04/2017 (Exact Date)  SpO2 100%  BMI 24.03 kg/m2     Physical Exam:  General Appearance: Alert, cooperative, no distress, appears stated age  Head: Normocephalic, without obvious abnormality, atraumatic  Eyes: PERRL, conjunctiva/corneas clear  Ears: Normal TM's and external ear canals, both ears  Nose: Nares normal, septum midline,mucosa normal, no drainage  Throat: Lips, mucosa, and tongue normal; teeth and gums normal, no erythema, exudate, or thrush  Neck: Supple, symmetrical, trachea midline, no adenopathy  Back: Symmetric, no curvature, ROM normal  Lungs: Clear to auscultation  bilaterally, respirations unlabored  Heart: Regular rate and rhythm, S1 and S2 normal, no murmur, rub, or gallop  Abdomen: Soft, non-tender, bowel sounds active all four quadrants; pregnant - 21 weeks, normal appearing abdomen  Extremities: Extremities normal, atraumatic, no cyanosis, trace bilateral LE edema  Skin: Skin color, texture, turgor normal, resolving macular rash on underside of forearms, web of fingers on right hand, and legs  Lymph nodes: Cervical & supraclavicular nodes normal  Neurologic: She is alert & oriented x 3. She has normal gait.  Psychiatric: She has a normal mood and affect.       Vladimir Suh CNP  Internal Medicine  [facility]     Return for Follow up with your Ob/Gyn.

## 2021-06-27 ENCOUNTER — HEALTH MAINTENANCE LETTER (OUTPATIENT)
Age: 36
End: 2021-06-27

## 2021-07-14 PROBLEM — O42.90 PROM (PREMATURE RUPTURE OF MEMBRANES): Status: RESOLVED | Noted: 2018-01-10 | Resolved: 2018-01-10

## 2021-07-14 PROBLEM — Z34.90 PREGNANT: Status: RESOLVED | Noted: 2018-01-10 | Resolved: 2018-01-10

## 2021-07-14 PROBLEM — Z34.80 ENCOUNTER FOR SUPERVISION OF OTHER NORMAL PREGNANCY: Status: RESOLVED | Noted: 2017-06-19 | Resolved: 2018-02-19

## 2021-07-14 PROBLEM — O48.0 POST-TERM PREGNANCY, 40-42 WEEKS OF GESTATION: Status: RESOLVED | Noted: 2018-01-08 | Resolved: 2018-01-10

## 2021-07-14 PROBLEM — O09.291: Status: RESOLVED | Noted: 2017-06-19 | Resolved: 2018-02-19

## 2021-07-14 PROBLEM — Z37.9 NORMAL LABOR: Status: RESOLVED | Noted: 2018-01-10 | Resolved: 2018-01-10

## 2021-10-16 ENCOUNTER — HEALTH MAINTENANCE LETTER (OUTPATIENT)
Age: 36
End: 2021-10-16

## 2022-07-23 ENCOUNTER — HEALTH MAINTENANCE LETTER (OUTPATIENT)
Age: 37
End: 2022-07-23

## 2022-10-01 ENCOUNTER — HEALTH MAINTENANCE LETTER (OUTPATIENT)
Age: 37
End: 2022-10-01

## 2023-08-06 ENCOUNTER — HEALTH MAINTENANCE LETTER (OUTPATIENT)
Age: 38
End: 2023-08-06